# Patient Record
Sex: FEMALE | Race: BLACK OR AFRICAN AMERICAN | NOT HISPANIC OR LATINO | URBAN - NONMETROPOLITAN AREA
[De-identification: names, ages, dates, MRNs, and addresses within clinical notes are randomized per-mention and may not be internally consistent; named-entity substitution may affect disease eponyms.]

---

## 2019-07-09 ENCOUNTER — IMPORTED ENCOUNTER (OUTPATIENT)
Dept: URBAN - NONMETROPOLITAN AREA CLINIC 1 | Facility: CLINIC | Age: 64
End: 2019-07-09

## 2019-07-09 PROCEDURE — 92002 INTRM OPH EXAM NEW PATIENT: CPT

## 2019-07-09 NOTE — PATIENT DISCUSSION
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 -  The patient/ physician elected to wait on surgery at this time. -  The patient was informed of the symptoms related to cataract progression.-  The patient was given a prescription for new glasses. *Diabetic Retinopathy:. mild npdr-  Discussed findings of exam in detail with the patient. -  discussed the risk of diabetic damage of the retina with potential vision loss and the importance of routine follow-up.

## 2019-07-10 PROBLEM — H25.13: Noted: 2019-07-10

## 2019-07-10 PROBLEM — E11.9: Noted: 2019-07-10

## 2019-07-10 PROBLEM — S05.01XA: Noted: 2019-07-10

## 2019-07-10 PROBLEM — H40.053: Noted: 2019-07-10

## 2019-07-25 ENCOUNTER — IMPORTED ENCOUNTER (OUTPATIENT)
Dept: URBAN - NONMETROPOLITAN AREA CLINIC 1 | Facility: CLINIC | Age: 64
End: 2019-07-25

## 2019-07-25 PROBLEM — H18.831: Noted: 2019-07-25

## 2019-07-25 PROBLEM — H25.13: Noted: 2019-07-10

## 2019-07-25 PROBLEM — H40.053: Noted: 2019-07-10

## 2019-07-25 PROBLEM — E11.9: Noted: 2019-07-10

## 2019-07-25 PROCEDURE — 92071 CONTACT LENS FITTING FOR TX: CPT

## 2019-07-25 PROCEDURE — 92012 INTRM OPH EXAM EST PATIENT: CPT

## 2019-07-25 NOTE — PATIENT DISCUSSION
CORNEAL EROSION ODDEBRIDE TODAYINSERT BCLSTART BESIVANCE BID OD1 DAY F/U*CATARACT OBSERVE-CHANGE GLASSES:.-  We discussed the patients cataract(s) in detail with the patient. -  The patient/ physician elected to wait on surgery at this time. -  The patient was informed of the symptoms related to cataract progression.-  The patient was given a prescription for new glasses. *Diabetic Retinopathy:. mild npdr-  Discussed findings of exam in detail with the patient. -  discussed the risk of diabetic damage of the retina with potential vision loss and the importance of routine follow-up.

## 2019-07-26 ENCOUNTER — IMPORTED ENCOUNTER (OUTPATIENT)
Dept: URBAN - NONMETROPOLITAN AREA CLINIC 1 | Facility: CLINIC | Age: 64
End: 2019-07-26

## 2019-07-26 PROCEDURE — 99213 OFFICE O/P EST LOW 20 MIN: CPT

## 2019-07-26 NOTE — PATIENT DISCUSSION
CORNEAL EROSION OD improvingINSERT new BCL todaycont BESIVANCE BID OD3 days f/u dr. Russell Falcon:.-  We discussed the patients cataract(s) in detail with the patient. -  The patient/ physician elected to wait on surgery at this time. -  The patient was informed of the symptoms related to cataract progression.-  The patient was given a prescription for new glasses. *Diabetic Retinopathy:. mild npdr-  Discussed findings of exam in detail with the patient. -  discussed the risk of diabetic damage of the retina with potential vision loss and the importance of routine follow-up.

## 2019-07-29 ENCOUNTER — IMPORTED ENCOUNTER (OUTPATIENT)
Dept: URBAN - NONMETROPOLITAN AREA CLINIC 1 | Facility: CLINIC | Age: 64
End: 2019-07-29

## 2019-07-29 PROCEDURE — 99213 OFFICE O/P EST LOW 20 MIN: CPT

## 2019-08-06 ENCOUNTER — IMPORTED ENCOUNTER (OUTPATIENT)
Dept: URBAN - NONMETROPOLITAN AREA CLINIC 1 | Facility: CLINIC | Age: 64
End: 2019-08-06

## 2019-08-06 PROCEDURE — 92012 INTRM OPH EXAM EST PATIENT: CPT

## 2019-08-06 NOTE — PATIENT DISCUSSION
recurring corneal abrasion left eyehealed corneal abrasion right eye-abrasions are likely to be a result of map-dot-fingerprint anterior corneal dystrophy-recommend to continue aggressive lubrication with artificial tears-return in 1 month PRN soonercataracts OU - observeglaucoma suspect OU - monitor with structural and functional tests

## 2019-09-23 ENCOUNTER — IMPORTED ENCOUNTER (OUTPATIENT)
Dept: URBAN - NONMETROPOLITAN AREA CLINIC 1 | Facility: CLINIC | Age: 64
End: 2019-09-23

## 2019-09-23 PROCEDURE — 92012 INTRM OPH EXAM EST PATIENT: CPT

## 2019-09-23 NOTE — PATIENT DISCUSSION
recurring corneal abrasion left eyehealed corneal abrasion right eye-abrasions are likely to be a result of map-dot-fingerprint anterior corneal dystrophy-recommend to continue aggressive lubrication with artificial tears-return in 1 month PRN soonercataracts OU - observeglaucoma suspect OU - monitor with structural and functional testsDES-Explained JOHN and associated symptoms.-Recommend increasing Omega 3s.-Pt to begin artificial tears OU QID PRN. Pt will contact us if this does not provide relief. Consider punctal plugs in that case.

## 2019-10-04 ENCOUNTER — IMPORTED ENCOUNTER (OUTPATIENT)
Dept: URBAN - NONMETROPOLITAN AREA CLINIC 1 | Facility: CLINIC | Age: 64
End: 2019-10-04

## 2019-10-04 PROCEDURE — 92014 COMPRE OPH EXAM EST PT 1/>: CPT

## 2019-10-04 NOTE — PATIENT DISCUSSION
recurring corneal abrasion left eyerecurring corneal abrasion right eye-Patient is in pain today-Patient is advised to call if condition worsens or seek help at emergency room with opthalmologist on call_Dr. Bob Johnston has prescribed Vigamox TID OD -refer to Dr. Moon Renteria or Dr. Liana Abad in [place] @ [facility] for management of recurrent corneal erosion-F/U here on Monday or with Dr. Moon Renteria if appt is scheduled. -abrasions are likely to be a result of map-dot-fingerprint anterior corneal dystrophy-recommend to continue aggressive lubrication with artificial tears-return in 1 month PRN soonercataracts OU - observeglaucoma suspect OU - monitor with structural and functional testsDES-Explained JOHN and associated symptoms.-Recommend increasing Omega 3s.-Pt to begin artificial tears OU QID PRN. Pt will contact us if this does not provide relief. Consider punctal plugs in that case.

## 2019-10-08 ENCOUNTER — IMPORTED ENCOUNTER (OUTPATIENT)
Dept: URBAN - NONMETROPOLITAN AREA CLINIC 1 | Facility: CLINIC | Age: 64
End: 2019-10-08

## 2019-10-08 PROCEDURE — 92071 CONTACT LENS FITTING FOR TX: CPT

## 2019-10-08 PROCEDURE — 99213 OFFICE O/P EST LOW 20 MIN: CPT

## 2019-10-08 NOTE — PATIENT DISCUSSION
Corneal Erosion OD healing-  discussed findings w/patient-  healing erosion OD-  d/c Moxifloxacin -  start Tobradex QID OD -  Acuvue Oasys BCL placed OD-  patient can cancel her appt with Dr. Lawrence Amayauise-  monitor Monday for f/u w/BCL removal or prn

## 2019-10-14 ENCOUNTER — IMPORTED ENCOUNTER (OUTPATIENT)
Dept: URBAN - NONMETROPOLITAN AREA CLINIC 1 | Facility: CLINIC | Age: 64
End: 2019-10-14

## 2019-10-14 PROBLEM — H18.831: Noted: 2020-01-13

## 2019-10-14 PROBLEM — B00.52: Noted: 2020-01-13

## 2019-10-14 PROBLEM — H40.053: Noted: 2019-10-14

## 2019-10-14 PROBLEM — H25.13: Noted: 2019-10-14

## 2019-10-14 PROBLEM — B00.52: Noted: 2019-10-14

## 2019-10-14 PROBLEM — E11.9: Noted: 2019-10-14

## 2019-10-14 PROBLEM — H18.831: Noted: 2019-10-14

## 2019-10-14 PROCEDURE — 92012 INTRM OPH EXAM EST PATIENT: CPT

## 2019-10-21 ENCOUNTER — IMPORTED ENCOUNTER (OUTPATIENT)
Dept: URBAN - NONMETROPOLITAN AREA CLINIC 1 | Facility: CLINIC | Age: 64
End: 2019-10-21

## 2019-10-21 PROCEDURE — 92012 INTRM OPH EXAM EST PATIENT: CPT

## 2020-01-13 ENCOUNTER — IMPORTED ENCOUNTER (OUTPATIENT)
Dept: URBAN - NONMETROPOLITAN AREA CLINIC 1 | Facility: CLINIC | Age: 65
End: 2020-01-13

## 2020-01-13 PROCEDURE — 92012 INTRM OPH EXAM EST PATIENT: CPT

## 2020-01-13 NOTE — PATIENT DISCUSSION
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

## 2020-01-27 ENCOUNTER — IMPORTED ENCOUNTER (OUTPATIENT)
Dept: URBAN - NONMETROPOLITAN AREA CLINIC 1 | Facility: CLINIC | Age: 65
End: 2020-01-27

## 2020-01-27 PROCEDURE — 92012 INTRM OPH EXAM EST PATIENT: CPT

## 2020-01-27 NOTE — PATIENT DISCUSSION
Corneal Erosion OU resolved -  discussed findings w/patient-  this has been a recurrent issue-  decrease FML 0.1% BID OD d/c OS-  continue Doxycycline 50mg BID PO-  RTC 2 weeks f/u or prnHSV Keratitis OS recurrent-  discussed findings w/patient-  1 inferior central dendrite noted today-  start Valtrex 500mg BID OU-  RTC 2 weeks f/u; Dr's Notes: PCP: Guillermina Farrell at Dr. Kath Harding

## 2020-02-10 ENCOUNTER — IMPORTED ENCOUNTER (OUTPATIENT)
Dept: URBAN - NONMETROPOLITAN AREA CLINIC 1 | Facility: CLINIC | Age: 65
End: 2020-02-10

## 2020-02-10 PROCEDURE — 99213 OFFICE O/P EST LOW 20 MIN: CPT

## 2020-02-10 NOTE — PATIENT DISCUSSION
HSV Keratitis OS recurrent-  discussed findings w/patient-  no dendrite noted at this time-  decrease Valtrex to 500mg/day-  continue to monitor as scheduled or prnCorneal Erosion OU resolved -  discussed findings w/patient-  this has been a recurrent issue-  d/c FML-  continue as scheduled or prnOcular Allergies OU-  discussed findings w/patient-  start Pazeo QD OU Rx sent today-  continue to monitor as scheduled or prn; Dr's Notes: PCP: Trista Phillips at Dr. Yonathan Hennessy

## 2020-02-24 ENCOUNTER — IMPORTED ENCOUNTER (OUTPATIENT)
Dept: URBAN - NONMETROPOLITAN AREA CLINIC 1 | Facility: CLINIC | Age: 65
End: 2020-02-24

## 2020-02-24 PROCEDURE — 92014 COMPRE OPH EXAM EST PT 1/>: CPT

## 2020-02-24 NOTE — PATIENT DISCUSSION
HSV Keratitis OS recurrent-  discussed findings w/patient-  no dendrite noted at this time-  continue Valtrex to 500mg/day-  continue to monitor as scheduled or prnCorneal Erosion OU resolved -  discussed findings w/patient-  this has been a recurrent issue-  (-)erosion today stable -  continue as scheduled or prnOcular Allergies OU-  discussed findings w/patient-  continue Pazeo QD OU-  continue to monitor 6 month f/u or prn Type II DM w/Mild Retinopathy -  discussed findings w/patient-  condition controlled with medication-  mild retinopathy noted OU -  reviewed the importance of good blood sugar control and the negative effects of poorly controlled sugars on ocular health -  monitor 6 month or prn Cataracts OU -  discussed findings w/patient-  no treatment indicated at this time -  UV protection recommended-  monitor yearly or prn; Dr's Notes: PCP: Melissa Arreaga at Dr. Solomon Rodriguez

## 2020-10-27 ENCOUNTER — IMPORTED ENCOUNTER (OUTPATIENT)
Dept: URBAN - NONMETROPOLITAN AREA CLINIC 1 | Facility: CLINIC | Age: 65
End: 2020-10-27

## 2020-10-27 PROCEDURE — 92012 INTRM OPH EXAM EST PATIENT: CPT

## 2020-10-27 NOTE — PATIENT DISCUSSION
Corneal Erosion ODINSERT BCL ODSTART ANKIT 128 TID ODOcular Allergies OU-  discussed findings w/patient-  continue Pazeo QD OU-  continue to monitor 6 month f/u or prn Type II DM w/Mild Retinopathy -  discussed findings w/patient-  condition controlled with medication-  mild retinopathy noted OU -  reviewed the importance of good blood sugar control and the negative effects of poorly controlled sugars on ocular health -  monitor 6 month or prn Cataracts OU -  discussed findings w/patient-  no treatment indicated at this time -  UV protection recommended-  monitor yearly or prn; Dr's Notes: PCP: Ahsan Szymanski at Dr. Paola Youngblood

## 2020-11-03 ENCOUNTER — IMPORTED ENCOUNTER (OUTPATIENT)
Dept: URBAN - NONMETROPOLITAN AREA CLINIC 1 | Facility: CLINIC | Age: 65
End: 2020-11-03

## 2020-11-03 PROCEDURE — 99213 OFFICE O/P EST LOW 20 MIN: CPT

## 2020-11-03 NOTE — PATIENT DISCUSSION
Corneal Erosion OD IMPROVEDREEMOVED BCL TODAYCONT ANKIT 128 TID OD X 1 MONTHOcular Allergies OU-  discussed findings w/patient-  continue Pazeo QD OU-  continue to monitor 6 month f/u or prn Type II DM w/Mild Retinopathy -  discussed findings w/patient-  condition controlled with medication-  mild retinopathy noted OU -  reviewed the importance of good blood sugar control and the negative effects of poorly controlled sugars on ocular health -  monitor 6 month or prn Cataracts OU -  discussed findings w/patient-  no treatment indicated at this time -  UV protection recommended-  monitor yearly or prn; Dr's Notes: PCP: Moris Mueller at Dr. Bob David

## 2021-02-17 ENCOUNTER — IMPORTED ENCOUNTER (OUTPATIENT)
Dept: URBAN - NONMETROPOLITAN AREA CLINIC 1 | Facility: CLINIC | Age: 66
End: 2021-02-17

## 2021-02-17 PROBLEM — H40.053: Noted: 2019-10-14

## 2021-02-17 PROBLEM — E11.9: Noted: 2019-10-14

## 2021-02-17 PROBLEM — H25.13: Noted: 2019-10-14

## 2021-02-17 PROBLEM — B00.52: Noted: 2020-01-13

## 2021-02-17 PROBLEM — S05.01XA: Noted: 2021-02-17

## 2021-02-17 PROCEDURE — 92012 INTRM OPH EXAM EST PATIENT: CPT

## 2021-02-17 NOTE — PATIENT DISCUSSION
CORNEAL ABRASIONDiscussed findings of exam in detail with the patient. Discussed the acute nature and treatment options with the patient. Discussed the importance of follow-up and the risk of serious infection   The patient was warned to return to the office immediately if they experience loss of vision or increased pain. The use of preserved artificial tears  was discussed with patient. START ERythromycin devan tid OD X14 days START Anay devan qhs OD X14 days; Dr's Notes: PCP: Victor Hugo Melchor at Dr. Abran Blum

## 2021-02-19 ENCOUNTER — IMPORTED ENCOUNTER (OUTPATIENT)
Dept: URBAN - NONMETROPOLITAN AREA CLINIC 1 | Facility: CLINIC | Age: 66
End: 2021-02-19

## 2021-02-19 PROBLEM — E11.9: Noted: 2019-10-14

## 2021-02-19 PROBLEM — H40.053: Noted: 2019-10-14

## 2021-02-19 PROBLEM — H18.831: Noted: 2021-02-19

## 2021-02-19 PROBLEM — B00.52: Noted: 2020-01-13

## 2021-02-19 PROBLEM — H25.13: Noted: 2019-10-14

## 2021-02-19 PROCEDURE — 92012 INTRM OPH EXAM EST PATIENT: CPT

## 2021-02-19 NOTE — PATIENT DISCUSSION
corneal erosion odeducate ptinsert bcl odcont ungstart besivance bid od; 's Notes: PCP: Kareem Trevino at Dr. Amada Sanders

## 2021-02-22 ENCOUNTER — IMPORTED ENCOUNTER (OUTPATIENT)
Dept: URBAN - NONMETROPOLITAN AREA CLINIC 1 | Facility: CLINIC | Age: 66
End: 2021-02-22

## 2021-02-22 PROCEDURE — 92012 INTRM OPH EXAM EST PATIENT: CPT

## 2021-02-22 NOTE — PATIENT DISCUSSION
corneal erosion od improvededucate ptleave bcl in todayd/c ungfinish besivance bid odif stable at f/u then resume devan qhs; 's Notes: PCP: Majo Kaur at Dr. Farhat Lombardi

## 2021-03-01 ENCOUNTER — IMPORTED ENCOUNTER (OUTPATIENT)
Dept: URBAN - NONMETROPOLITAN AREA CLINIC 1 | Facility: CLINIC | Age: 66
End: 2021-03-01

## 2021-03-01 PROCEDURE — 92012 INTRM OPH EXAM EST PATIENT: CPT

## 2021-03-01 NOTE — PATIENT DISCUSSION
corneal erosion od improvededucate ptremoved bcl in todayrestart devan qhs; Dr's Notes: PCP: Selina Tinajero at Dr. Luke Monzon

## 2021-05-04 ENCOUNTER — IMPORTED ENCOUNTER (OUTPATIENT)
Dept: URBAN - NONMETROPOLITAN AREA CLINIC 1 | Facility: CLINIC | Age: 66
End: 2021-05-04

## 2021-05-04 PROBLEM — E11.9: Noted: 2021-05-04

## 2021-05-04 PROBLEM — H25.13: Noted: 2021-05-04

## 2021-05-04 PROBLEM — H18.831: Noted: 2021-02-19

## 2021-05-04 PROCEDURE — 92014 COMPRE OPH EXAM EST PT 1/>: CPT

## 2021-05-04 NOTE — PATIENT DISCUSSION
corneal erosion odstable todaymonitor for changesCataract OU-Not yet surgical. -Reviewed symptoms of advancing cataract growth such as glare and halos and decreased vision.-Continue to monitor for now. Pt will notify us if any new symptoms develop. DM s -Stressed the importance of keeping blood sugars under control blood pressure under control and weight normalization and regular visits with PCP. -Explained the possible effects of poorly controlled diabetes and the damage that diabetes can cause to ocular health. -Patient to check HgbA1C.-Pt instructed to contact our office with any vision changes.; Dr's Notes: PCP: Mg Mendez at Dr. Coello Standing to send emycin devan rx OD QHS as needed

## 2021-12-14 ENCOUNTER — IMPORTED ENCOUNTER (OUTPATIENT)
Dept: URBAN - NONMETROPOLITAN AREA CLINIC 1 | Facility: CLINIC | Age: 66
End: 2021-12-14

## 2021-12-14 PROBLEM — H25.13: Noted: 2021-12-14

## 2021-12-14 PROBLEM — E11.9: Noted: 2021-12-14

## 2021-12-14 PROCEDURE — 92014 COMPRE OPH EXAM EST PT 1/>: CPT

## 2021-12-14 NOTE — PATIENT DISCUSSION
Cataract OU-Not yet surgical. -Reviewed symptoms of advancing cataract growth such as glare and halos and decreased vision.-Continue to monitor for now. Pt will notify us if any new symptoms develop. consider referral after next visitDM s DR-Stressed the importance of keeping blood sugars under control blood pressure under control and weight normalization and regular visits with PCP. -Explained the possible effects of poorly controlled diabetes and the damage that diabetes can cause to ocular health. -Patient to check HgbA1C.-Pt instructed to contact our office with any vision changes.; Dr's Notes: PCP: Lucina Apley at Dr. Fransisca Mcdonald to send emycin devan rx OD QHS as needed

## 2022-04-15 ASSESSMENT — VISUAL ACUITY
OD_PH: 20/30-1
OS_SC: 20/50+3
OD_PH: 20/40
OS_SC: 20/60
OD_SC: 20/80
OD_SC: 20/40-
OS_SC: 20/70
OD_SC: 20/100
OS_SC: 20/50-1
OS_CC: 20/30-2
OU_CC: 20/20
OS_SC: 20/50+2
OU_CC: J1+
OS_SC: 20/60-2
OS_SC: 20/50
OS_SC: 20/60
OD_SC: 20/400 BLURRY
OD_SC: 20/100
OD_CC: 20/30-2
OS_SC: 20/60
OD_PH: 20/40-2
OD_SC: 20/50
OS_PH: 20/40
OS_SC: 20/60
OS_SC: 20/50-
OD_SC: 20/50
OS_SC: 20/60
OD_SC: 20/30-1
OS_SC: 20/50-1
OD_SC: 20/70-1
OD_PH: 20/30
OD_SC: 20/50-2
OS_SC: 20/50-2
OD_SC: 20/30-2
OD_SC: 20/60
OD_SC: 20/80
OD_SC: 20/50
OS_SC: 20/40
OD_SC: 20/40
OS_SC: 20/30-2
OS_SC: 20/50-2
OD_SC: 20/30
OS_PH: 20/40
OD_SC: 20/40
OS_SC: 20/40
OS_SC: 20/50+2
OU_SC: 20/40-1
OD_SC: 20/70-2
OD_SC: 20/50-2
OD_SC: 20/30-2
OU_SC: 20/50
OD_SC: 20/70

## 2022-04-15 ASSESSMENT — TONOMETRY
OS_IOP_MMHG: 22
OS_IOP_MMHG: 23
OS_IOP_MMHG: 20
OS_IOP_MMHG: 19
OS_IOP_MMHG: 20
OS_IOP_MMHG: 16
OD_IOP_MMHG: 20
OD_IOP_MMHG: 22
OS_IOP_MMHG: 16
OD_IOP_MMHG: 20
OD_IOP_MMHG: 20
OS_IOP_MMHG: 21
OD_IOP_MMHG: 20
OS_IOP_MMHG: 15
OD_IOP_MMHG: 22
OS_IOP_MMHG: 20
OD_IOP_MMHG: 16
OS_IOP_MMHG: 20
OD_IOP_MMHG: 16
OD_IOP_MMHG: 22
OD_IOP_MMHG: 15
OD_IOP_MMHG: 20

## 2022-05-12 ENCOUNTER — EMERGENCY VISIT (OUTPATIENT)
Dept: RURAL CLINIC 1 | Facility: CLINIC | Age: 67
End: 2022-05-12

## 2022-05-12 DIAGNOSIS — H18.831: ICD-10-CM

## 2022-05-12 PROCEDURE — 99213 OFFICE O/P EST LOW 20 MIN: CPT

## 2022-05-12 ASSESSMENT — VISUAL ACUITY
OD_CC: CF 2FT
OS_CC: 20/60

## 2022-05-12 NOTE — PATIENT DISCUSSION
Cataract OU-Not yet surgical. -Reviewed symptoms of advancing cataract growth such as glare and halos and decreased vision.-Continue to monitor for now. Pt will notify us if any new symptoms develop. consider referral after next visitDM s DR-Stressed the importance of keeping blood sugars under control blood pressure under control and weight normalization and regular visits with PCP. -Explained the possible effects of poorly controlled diabetes and the damage that diabetes can cause to ocular health. -Patient to check HgbA1C.-Pt instructed to contact our office with any vision changes.; Dr's Notes: PCP: Fransisca Meneses at Dr. Wilbert Peterson to send emycin devan rx OD QHS as needed.

## 2022-05-16 ENCOUNTER — FOLLOW UP (OUTPATIENT)
Dept: RURAL CLINIC 1 | Facility: CLINIC | Age: 67
End: 2022-05-16

## 2022-05-16 DIAGNOSIS — H18.831: ICD-10-CM

## 2022-05-16 PROCEDURE — 99213 OFFICE O/P EST LOW 20 MIN: CPT

## 2022-05-16 ASSESSMENT — VISUAL ACUITY
OU_SC: 20/40-1
OD_SC: 20/30-1
OS_SC: 20/100-1
OD_SC: 20/50-1
OS_SC: 20/30-1
OU_SC: 20/30-1

## 2022-06-28 ENCOUNTER — ESTABLISHED PATIENT (OUTPATIENT)
Dept: RURAL CLINIC 1 | Facility: CLINIC | Age: 67
End: 2022-06-28

## 2022-06-28 DIAGNOSIS — H18.831: ICD-10-CM

## 2022-06-28 DIAGNOSIS — E11.9: ICD-10-CM

## 2022-06-28 DIAGNOSIS — H25.13: ICD-10-CM

## 2022-06-28 PROCEDURE — 92014 COMPRE OPH EXAM EST PT 1/>: CPT

## 2022-06-28 ASSESSMENT — VISUAL ACUITY
OS_CC: 20/50-2
OD_CC: 20/30

## 2022-06-28 ASSESSMENT — TONOMETRY
OS_IOP_MMHG: 14
OD_IOP_MMHG: 15

## 2024-01-15 ENCOUNTER — ESTABLISHED PATIENT (OUTPATIENT)
Dept: RURAL CLINIC 1 | Facility: CLINIC | Age: 69
End: 2024-01-15

## 2024-01-15 DIAGNOSIS — H18.831: ICD-10-CM

## 2024-01-15 DIAGNOSIS — H25.13: ICD-10-CM

## 2024-01-15 DIAGNOSIS — E11.9: ICD-10-CM

## 2024-01-15 PROCEDURE — 92014 COMPRE OPH EXAM EST PT 1/>: CPT

## 2024-01-15 ASSESSMENT — VISUAL ACUITY
OS_SC: 20/70
OU_SC: 20/60+2
OD_SC: 20/70

## 2025-03-31 ENCOUNTER — EMERGENCY VISIT (OUTPATIENT)
Age: 70
End: 2025-03-31

## 2025-03-31 DIAGNOSIS — E11.9: ICD-10-CM

## 2025-03-31 DIAGNOSIS — H18.831: ICD-10-CM

## 2025-03-31 DIAGNOSIS — H25.13: ICD-10-CM

## 2025-03-31 PROCEDURE — 99213 OFFICE O/P EST LOW 20 MIN: CPT

## 2025-03-31 PROCEDURE — 92071 CONTACT LENS FITTING FOR TX: CPT

## 2025-04-07 ENCOUNTER — FOLLOW UP (OUTPATIENT)
Age: 70
End: 2025-04-07

## 2025-04-07 DIAGNOSIS — H18.831: ICD-10-CM

## 2025-04-07 PROCEDURE — 92012 INTRM OPH EXAM EST PATIENT: CPT

## 2025-05-05 ENCOUNTER — FOLLOW UP (OUTPATIENT)
Age: 70
End: 2025-05-05

## 2025-05-05 DIAGNOSIS — H25.13: ICD-10-CM

## 2025-05-05 DIAGNOSIS — H18.831: ICD-10-CM

## 2025-05-05 DIAGNOSIS — E11.9: ICD-10-CM

## 2025-05-05 PROCEDURE — 92014 COMPRE OPH EXAM EST PT 1/>: CPT

## 2025-05-13 ENCOUNTER — EMERGENCY VISIT (OUTPATIENT)
Age: 70
End: 2025-05-13

## 2025-05-13 DIAGNOSIS — H25.13: ICD-10-CM

## 2025-05-13 DIAGNOSIS — S05.02XA: ICD-10-CM

## 2025-05-13 PROCEDURE — 92071 CONTACT LENS FITTING FOR TX: CPT

## 2025-05-13 PROCEDURE — 99213 OFFICE O/P EST LOW 20 MIN: CPT

## 2025-05-16 ENCOUNTER — FOLLOW UP (OUTPATIENT)
Age: 70
End: 2025-05-16

## 2025-05-16 DIAGNOSIS — S05.02XD: ICD-10-CM

## 2025-05-16 DIAGNOSIS — H25.13: ICD-10-CM

## 2025-05-16 PROCEDURE — 92012 INTRM OPH EXAM EST PATIENT: CPT

## 2025-06-25 ENCOUNTER — CONSULTATION/EVALUATION (OUTPATIENT)
Age: 70
End: 2025-06-25

## 2025-06-25 DIAGNOSIS — H25.813: ICD-10-CM

## 2025-06-25 DIAGNOSIS — S05.02XS: ICD-10-CM

## 2025-06-25 PROCEDURE — 92136 OPHTHALMIC BIOMETRY: CPT

## 2025-06-25 PROCEDURE — 92025 CPTRIZED CORNEAL TOPOGRAPHY: CPT | Mod: NC

## 2025-06-25 PROCEDURE — 99214 OFFICE O/P EST MOD 30 MIN: CPT

## 2025-06-25 PROCEDURE — 92134 CPTRZ OPH DX IMG PST SGM RTA: CPT | Mod: NC

## 2025-07-28 ENCOUNTER — PRE-OP/H&P (OUTPATIENT)
Age: 70
End: 2025-07-28

## 2025-07-28 VITALS
WEIGHT: 221 LBS | SYSTOLIC BLOOD PRESSURE: 128 MMHG | HEART RATE: 92 BPM | BODY MASS INDEX: 34.69 KG/M2 | HEIGHT: 67 IN | DIASTOLIC BLOOD PRESSURE: 78 MMHG

## 2025-07-28 DIAGNOSIS — Z01.818: ICD-10-CM

## 2025-07-28 DIAGNOSIS — E78.00: ICD-10-CM

## 2025-07-28 DIAGNOSIS — I10: ICD-10-CM

## 2025-07-28 DIAGNOSIS — E11.9: ICD-10-CM

## 2025-07-28 PROCEDURE — 99213 OFFICE O/P EST LOW 20 MIN: CPT

## 2025-08-11 ENCOUNTER — SURGERY/PROCEDURE (OUTPATIENT)
Age: 70
End: 2025-08-11

## 2025-08-11 DIAGNOSIS — H25.812: ICD-10-CM

## 2025-08-11 DIAGNOSIS — H25.89: ICD-10-CM

## 2025-08-11 PROCEDURE — 66982 XCAPSL CTRC RMVL CPLX WO ECP: CPT

## 2025-08-12 ENCOUNTER — POST OP/EVAL OF SECOND EYE (OUTPATIENT)
Age: 70
End: 2025-08-12

## 2025-08-12 DIAGNOSIS — Z96.1: ICD-10-CM

## 2025-08-12 DIAGNOSIS — H25.811: ICD-10-CM

## 2025-08-12 PROCEDURE — 99024 POSTOP FOLLOW-UP VISIT: CPT

## 2025-08-25 ENCOUNTER — SURGERY/PROCEDURE (OUTPATIENT)
Age: 70
End: 2025-08-25

## 2025-08-25 DIAGNOSIS — H25.89: ICD-10-CM

## 2025-08-25 DIAGNOSIS — H25.811: ICD-10-CM

## 2025-08-25 PROCEDURE — 66982 XCAPSL CTRC RMVL CPLX WO ECP: CPT | Mod: 79,RT

## 2025-08-26 ENCOUNTER — POST-OP (OUTPATIENT)
Age: 70
End: 2025-08-26

## 2025-08-26 DIAGNOSIS — Z96.1: ICD-10-CM

## 2025-08-26 PROCEDURE — 99024 POSTOP FOLLOW-UP VISIT: CPT
